# Patient Record
Sex: MALE | Race: BLACK OR AFRICAN AMERICAN | NOT HISPANIC OR LATINO | URBAN - METROPOLITAN AREA
[De-identification: names, ages, dates, MRNs, and addresses within clinical notes are randomized per-mention and may not be internally consistent; named-entity substitution may affect disease eponyms.]

---

## 2024-12-26 ENCOUNTER — APPOINTMENT (OUTPATIENT)
Dept: OPHTHALMOLOGY | Facility: CLINIC | Age: 29
End: 2024-12-26
Payer: MEDICAID

## 2025-01-06 ENCOUNTER — APPOINTMENT (OUTPATIENT)
Dept: OPHTHALMOLOGY | Facility: CLINIC | Age: 30
End: 2025-01-06
Payer: MEDICAID

## 2025-01-07 ENCOUNTER — APPOINTMENT (OUTPATIENT)
Dept: OPHTHALMOLOGY | Facility: CLINIC | Age: 30
End: 2025-01-07
Payer: MEDICAID

## 2025-01-14 ENCOUNTER — APPOINTMENT (OUTPATIENT)
Dept: OPHTHALMOLOGY | Facility: CLINIC | Age: 30
End: 2025-01-14
Payer: MEDICAID

## 2025-01-28 ENCOUNTER — APPOINTMENT (OUTPATIENT)
Dept: OPHTHALMOLOGY | Facility: CLINIC | Age: 30
End: 2025-01-28
Payer: MEDICAID

## 2025-01-28 DIAGNOSIS — H18.603 KERATOCONUS OF BOTH EYES: Primary | ICD-10-CM

## 2025-01-28 RX ORDER — ERGOCALCIFEROL 1.25 MG/1
CAPSULE ORAL WEEKLY
COMMUNITY
Start: 2019-11-27

## 2025-01-28 RX ORDER — DEXTROAMPHETAMINE SACCHARATE, AMPHETAMINE ASPARTATE MONOHYDRATE, DEXTROAMPHETAMINE SULFATE AND AMPHETAMINE SULFATE 5; 5; 5; 5 MG/1; MG/1; MG/1; MG/1
CAPSULE, EXTENDED RELEASE ORAL
COMMUNITY
Start: 2015-07-09

## 2025-01-28 RX ORDER — CLINDAMYCIN PHOSPHATE 10 MG/G
GEL TOPICAL
COMMUNITY
Start: 2017-07-28

## 2025-01-28 ASSESSMENT — REFRACTION_MANIFEST
OD_AXIS: 010
OS_SPHERE: -6.75
OS_CYLINDER: -7.00
OS_CYLINDER: -5.75
OD_CYLINDER: -6.25
OS_AXIS: 165
OD_SPHERE: -10.50
METHOD_AUTOREFRACTION: 1
OS_AXIS: 165
OD_SPHERE: -9.75
OD_AXIS: 010
OD_CYLINDER: -6.00
OS_SPHERE: -7.50

## 2025-01-28 ASSESSMENT — CONF VISUAL FIELD
OS_SUPERIOR_TEMPORAL_RESTRICTION: 0
OS_SUPERIOR_NASAL_RESTRICTION: 0
OD_INFERIOR_TEMPORAL_RESTRICTION: 0
OD_INFERIOR_NASAL_RESTRICTION: 0
OS_INFERIOR_NASAL_RESTRICTION: 0
OD_SUPERIOR_TEMPORAL_RESTRICTION: 0
OS_INFERIOR_TEMPORAL_RESTRICTION: 0
OD_NORMAL: 1
METHOD: COUNTING FINGERS
OD_SUPERIOR_NASAL_RESTRICTION: 0
OS_NORMAL: 1

## 2025-01-28 ASSESSMENT — REFRACTION_CURRENTRX
OS_CYLINDER: SPHERE
OS_SPHERE: -2.00
OD_SPHERE: -3.00
OS_DIAMETER: 17.0
OS_SPHERE: -3.00
OD_BASECURVE: 7.80
OS_DIAMETER: 17.0
OS_BASECURVE: 7.80
OS_BASECURVE: 7.80
OS_BRAND: ZENLENS PROLATE
OD_DIAMETER: 17.0
OD_BASECURVE: 7.80
OD_CYLINDER: SPHERE
OD_BRAND: ZENLENS PROLATE
OD_SPHERE: -2.00
OD_BRAND: ZENLENS PROLATE
OD_DIAMETER: 17.0

## 2025-01-28 ASSESSMENT — VISUAL ACUITY
METHOD: SNELLEN - LINEAR
OS_PH_SC: 20/80
OD_SC: 20/80
VA_OR_OD_CURRENT_RX: 20/20-1
OD_PH_SC: 20/70
OS_SC: 20/100
VA_OR_OS_CURRENT_RX: 20/20

## 2025-01-28 ASSESSMENT — ENCOUNTER SYMPTOMS
HEMATOLOGIC/LYMPHATIC NEGATIVE: 0
ENDOCRINE NEGATIVE: 0
RESPIRATORY NEGATIVE: 0
PSYCHIATRIC NEGATIVE: 0
MUSCULOSKELETAL NEGATIVE: 0
CARDIOVASCULAR NEGATIVE: 0
GASTROINTESTINAL NEGATIVE: 0
EYES NEGATIVE: 1
NEUROLOGICAL NEGATIVE: 0
ALLERGIC/IMMUNOLOGIC NEGATIVE: 0
CONSTITUTIONAL NEGATIVE: 0

## 2025-01-28 ASSESSMENT — EXTERNAL EXAM - RIGHT EYE: OD_EXAM: NORMAL

## 2025-01-28 ASSESSMENT — SLIT LAMP EXAM - LIDS
COMMENTS: NORMAL
COMMENTS: NORMAL

## 2025-01-28 ASSESSMENT — EXTERNAL EXAM - LEFT EYE: OS_EXAM: NORMAL

## 2025-01-28 NOTE — Clinical Note
Both eyes (OU) Contact Lens Order  Quantity: 1 Package: RGP Appointment needed? Yes Medically necessary? Yes Ship To: [facility] Additional instructions: RX2

## 2025-01-28 NOTE — PROGRESS NOTES
Assessment/Plan   Diagnoses and all orders for this visit:  Keratoconus of both eyes  -     Corneal Topography - OU - Both Eyes    Keratoconus: This is a disease of the cornea thought to be genetic that causes thinning, protrusion and irregularity of the cornea.  Because the cornea is the most important refracting surface of the eye, images are distorted and cannot be corrected well with traditional glasses.  Specialty contact lenses, most often rigid lenses, are required for best vision. A small percentage of patients require a corneal transplant.  Collagen crosslinking is considered for patients that show progression of the disease.     Successful fit in [facility] scleral CLs today. CLs ordered for in office dispense and I&R. Discussed that cross-linking can be considered in the future, after subsequent visits and evidence of progression. Monitor in 2-4 weeks for CL dispense and I&R or sooner with changes in vision.

## 2025-01-28 NOTE — RESEARCH NOTES
STUDY TEAM VISIT NOTE  Study Name: Genetic Risk of Recurrent Keratoconus Study  IRB#: [id number]  PI: Shruthi Murry OD, PhD  Coordinator for Today's Visit: Naty Peñaloza    Time point: Enrollment    Encounter Date: 01/28/2025  Encounter Time:  8:15 AM EST  Encounter Department: [facility]    INFORMED CONSENT   Review and sign Informed Consent Form with participant prior to beginning any study procedures: Yes  Document Informed Consent process on  Informed Consent Documentation Template and EHR : Yes   Subject age should be assessed at each visit to confirm current ICF is applicable: Yes      ALLERGY & HISTORY REVIEW   No Known Allergies  Past Medical History:   Diagnosis Date    Disorder of the skin and subcutaneous tissue, unspecified 12/22/2014    Skin lesion    Other chronic allergic conjunctivitis     Chronic allergic conjunctivitis    Personal history of urinary (tract) infections 01/01/2015    History of nongonococcal urethritis due to Chlamydia trachomatis     History reviewed. No pertinent surgical history.  No family history on file.     ASSESSMENT   Patient presented for enrollment into the Genetic Risk of Recurrent Keratoconus study.    Patient was consented and the Informed consent was obtained per Department SOP guidelines. The subject was found eligible to participate in the study.    All study data can be found on Electronic Data Capture system and on Case Report forms (as needed). Study procedures performed per protocol.     Visit completed uneventfully.      COORDINATOR CHECKLIST   Subject visit entered in [employer].  Subject compensation provided.  Were any assessments not completed during this visit?  If Yes, document protocol deviation.     Naty Peñaloza  01/28/25

## 2025-01-29 LAB
CENTRAL CORNEA THICKNESS (OD): 460 MICRONS
CENTRAL CORNEA THICKNESS (OS): 483 MICRONS
KMAX (OD): 62.6 DIOPTERS
KMAX (OS): 58.2 DIOPTERS
PACH THINNEST (OD): 445 MICRONS
PACH THINNEST (OS): 456 MICRONS
POSTERIOR FLOAT (OD): 489 MICRONS
POSTERIOR FLOAT (OS): 457 MICRONS
SIMK FLAT (OD): 49.5 DIOPTERS
SIMK FLAT (OS): 46.5 DIOPTERS
SIMK STEEP (OD): 55.7 DIOPTERS
SIMK STEEP (OS): 53.8 DIOPTERS
SIMK STEEP AXIS (OD): 74.4 DEGREES
SIMK STEEP AXIS (OS): 63.2 DEGREES

## 2025-02-18 ENCOUNTER — APPOINTMENT (OUTPATIENT)
Dept: OPHTHALMOLOGY | Facility: CLINIC | Age: 30
End: 2025-02-18
Payer: MEDICAID

## 2025-02-18 DIAGNOSIS — H18.603 KERATOCONUS OF BOTH EYES: Primary | ICD-10-CM

## 2025-02-18 PROCEDURE — 99212 OFFICE O/P EST SF 10 MIN: CPT | Performed by: OPTOMETRIST

## 2025-02-18 RX ORDER — SODIUM CHLORIDE FOR INHALATION 0.9 %
VIAL, NEBULIZER (ML) INHALATION
Qty: 500 ML | Refills: 3 | Status: SHIPPED | OUTPATIENT
Start: 2025-02-18

## 2025-02-18 ASSESSMENT — CONF VISUAL FIELD
OS_INFERIOR_NASAL_RESTRICTION: 0
OD_INFERIOR_NASAL_RESTRICTION: 0
OD_SUPERIOR_NASAL_RESTRICTION: 0
OD_SUPERIOR_TEMPORAL_RESTRICTION: 0
OD_INFERIOR_TEMPORAL_RESTRICTION: 0
OS_SUPERIOR_NASAL_RESTRICTION: 0
OS_NORMAL: 1
OD_NORMAL: 1
OS_SUPERIOR_TEMPORAL_RESTRICTION: 0
OS_INFERIOR_TEMPORAL_RESTRICTION: 0

## 2025-02-18 ASSESSMENT — VISUAL ACUITY
METHOD: SNELLEN - LINEAR
VA_OR_OD_CURRENT_RX: 20/25+2
OS_SC: 20/80
VA_OR_OS_CURRENT_RX: 20/20-2
OD_SC: 20/100

## 2025-02-18 ASSESSMENT — ENCOUNTER SYMPTOMS
CONSTITUTIONAL NEGATIVE: 0
PSYCHIATRIC NEGATIVE: 0
EYES NEGATIVE: 1
ALLERGIC/IMMUNOLOGIC NEGATIVE: 0
NEUROLOGICAL NEGATIVE: 0
GASTROINTESTINAL NEGATIVE: 0
RESPIRATORY NEGATIVE: 0
ENDOCRINE NEGATIVE: 0
MUSCULOSKELETAL NEGATIVE: 0
CARDIOVASCULAR NEGATIVE: 0
HEMATOLOGIC/LYMPHATIC NEGATIVE: 0

## 2025-02-18 ASSESSMENT — REFRACTION_CURRENTRX
OD_BRAND: ZENLENS PROLATE
OS_DIAMETER: 17.0
OD_DIAMETER: 17.0
OS_SPHERE: -2.00
OS_BASECURVE: 7.80
OD_BASECURVE: 7.80
OD_SPHERE: -2.00

## 2025-02-18 ASSESSMENT — EXTERNAL EXAM - LEFT EYE: OS_EXAM: NORMAL

## 2025-02-18 ASSESSMENT — SLIT LAMP EXAM - LIDS
COMMENTS: NORMAL
COMMENTS: NORMAL

## 2025-02-18 ASSESSMENT — EXTERNAL EXAM - RIGHT EYE: OD_EXAM: NORMAL

## 2025-02-18 NOTE — PATIENT INSTRUCTIONS
Dr. Shruthi Sharma        Appointments:   [phone]  Contact Lens Orders:  [phone]       GAS PERMEABLE CONTACT LENSES  CARE AND GENERAL GUIDELINES  General Contact Lens Guidelines    Any time a contact lens is removed from the eye, it must be cleaned and disinfected before being reinserted    Always wash hands before handling contact lenses.  Avoid soaps containing additives such as lotions, creams, oils or perfumes. Anti-bacterial soaps are recommended    Whenever lenses have been removed from the case, discard the solution, rinse the case vigorously with saline or disinfecting solution, wipe with clean, dry tissue and allow it to air dry upside down.      Replace lens case monthly.  It is critical to keep your lens case CLEAN!     Routine replacement of the contact lens case can help to reduce the risk of contact lens contamination    Use only commercially prepared saline and cleaning solutions, never use homemade or generic saline    Never reuse solutions after one cleaning/disinfection cycle    Never use saliva or water to moisten a contact lens, never put a contact lens in your mouth.  Doing any of these may lead to an eye infection    Patients should always check with us before changing solutions    Contact lenses should never be stored in non-sterile fluids such as distilled water, tap water, bottled water or home purified water    Lenses or cases should not be rinsed in tap water.  Traditional contact lens approved saline solutions may be used to rinse  off lenses or if needed prior to insertion.    Lens lubricating/rewetting drops can be placed directly into the eye while contact lenses are being worn to increase lens wearing comfort    Do not sleep in you lenses unless you have been fit with lenses specifically designed for extended-wear and your doctor has approved them for  that purpose    Avoid swimming pools and hot tubs while wearing your lenses    All contact lens wearers, with few exceptions, need a pair of spectacles for emergencies and for rest from contact lenses    Contact lens wearers should have an eye exam annually or sooner, if indicated by your doctor    Cosmetics:     Apply makeup after inserting contacts and remove contact lenses before removing makeup.  This will help prevent transfer of these substances from your hands to the lens surface.    Use a good quality, water soluble mascara rather than waterproof or water-resistant types.  Replace old mascara every three months as it may become contaminated and cause infection.    Avoid aerosol sprays when your contacts are in, or remember to shield your eyes.  Walk away from the area where the spray was used since a mist of spray often lingers in the air.  If possible, use hairspray before inserting your contact lenses.    Adaptation:    Complete adaptation to contact lenses normally takes from 2-6 weeks, and some patients may take up to 12 weeks.  Normal adaptation symptoms include:    A sensation that feels much like a small eyelash is in the eye.  This feeling gradually disappears    Vision may be a little watery and may change as you blink    You may notice mild redness, tearing or itching of the eyes    Awareness of lens movement or increased blinking may be noticed.  It is important that you continue to blink fully and completely    You may be sensitive to bright light.  This sensitivity will lessen, but a good non-prescription pair of sunglasses will often provide the greatest comfort outdoors    You may experience halos or edge awareness while adapting    Some patients have temporarily improved uncorrected vision when they remove their lenses    You may notice variable vision when you remove your lenses.  This may include increased blurred vision with no glasses or with your habitual glasses, which lasts from 30  minutes to up to 4 hours after rigid lenses are removed.  This effect is greatest with patients that have had corneal surgery.    Signs of Caution: If you are ever unsure about whether what you are experiencing is part of normal adaptation, please call your doctor.    Persistent or severe redness  Continued or excessive tearing  Extreme light sensitivity  Inability to open eyes   Pain      REMEMBER: If in DOUBT, take your lenses OUT!            Lens Wear:    We have recommended a schedule for wearing the lenses during the adaptation period.  This consists of slowly increasing the wearing time so your eyes adapt properly to the lenses.  How rapidly the wearing time increases depends upon the type of lens being worn and the specific characteristics of your eyes.  Follow the wearing schedule below unless your doctor tells you otherwise:    Day One: 4 hours  Day Two: 5 hours  Day Three: 6 hours  Day Four: 7 hours  Day Five: 8 hours  Day Six: 9 hours  Day Seven: 10 hours    After Day Seven you will remain at 10 hours of wearing time maximum until your scheduled follow-up appointment.    REWETTING DROPS FOR COMFORT:    *Acceptable rigid gas permeable rewetting drops:    [place] Rewetting Drops   Refresh Relieva for Contacts Rewetting Drops   Blink for Contacts Rewetting Drops                *We do not recommend artificial tears to be used while the lenses are worn.    NOTE for Saline Rinses:     You may purchase a commercially available saline such as Bausch & Lomb Sensitive Eyes Saline in a drug store, or a single use non-preserved saline solution such as Lacripure (by Menicon) or ScleralFil (by Bausch & Lomb) available at our office or on Amazon    DO NOT STORE LENSES OVERNIGHT IN SALINE SOLUTION, IT IS USED FOR RINSES ONLY      FOR TRADITIONAL RIGID GAS PERMEABLE CONTACT LENSES  (These are the smaller rigid lens types that sit directly on your cornea)    INSERTION  o Wash hands with lotion free soap and DRY HANDS.   o  Place lens in the palm of your hand.   o Rinse lens (if needed) and drain excess solution by cupping your hand.   o Place lens on DRY index finger of dominant hand  o Use your middle finger of the non-dominant hand to hold up your upper lashes and your      other middle finger to pull your lower lid down.   o Place lens directly on the colored part of your eye. Pull your index finger away and slowly release your lids     [place] and UNIQUE pH USERS: If the lens has been soaking overnight, you may insert the lens directly onto your eye in the morning with the [place] or Unique pH conditioning solution still on the lens, or you may rinse the lens in fresh rewetting/conditioning solution and then place it directly onto the eye.    CLEAR CARE USERS: If the lens has been fully neutralized, we suggest another rinse with fresh saline or a conditioning solution ([place] or Unique pH) prior to insertion     REMOVAL  BLINK METHOD  Tilt your head down while looking into a mirror flat on a table.    Cup your hand under the eye to catch the lens as it is removed from the eye.  Open your eyes wide and pull tightly (pull out and up) at the outer corner of your eye.  Blink hard.  The lens should pop out    TWO HAND METHOD  Tilt your head down while looking into a mirror flat on a table.  Place the index finger of one hand on the upper eyelid directly next to the lash line and above the contact lens.  Place the index finger of the other hand similarly on the lower lid, but below the contact lens.  (You must be very close to your lash line!  Do not invert your lids to show the inner pink portion underneath!)  Gently press the lids against the white part of the eye.  Maintain pressure against the eye and gently bring the upper and lower lids together to squeeze the lens out.  The lens should pop out.    CLEANING ….If cleaning lenses over a sink make sure that the drain is plugged!!!    IF USING [place] ORIGINAL OR [place]ON ADVANCE  Remove  lens and place in palm of hand  Place 2 drops of daily  on lens (maroon cap, red tip)   Rub lens gently for 15 seconds  Rinse lens with saline  Place lens in contact lens case with fresh conditioning solution (blue cap, white tip) overnight (at least 6 hours)    IF USING [place] SIMPLUS or UNIQUE pH**  Remove lens and place in palm of hand  Place 2 drops of [place] Simplus or Unique pH solution on lens  Rub lens gently for 15 seconds, rinse with saline  Place lens in contact lens case with fresh [place] Simplus or Unique pH solution overnight (at least 6 hours)      IF USING CLEAR CARE    Rub lenses with Clear Care solution (watch touching eyes after this because this is hydrogen peroxide and if it touches your eye directly it will sting!0  Rinse lenses with saline solution  Place lenses in appropriate side of basket marked right or left.  Fill clear vial to the line with Clear Care disinfecting solution.  Place contacts in disinfecting solution (foaming will occur, don’t be alarmed if vial overflows) for at least 6 hours  The disk neutralizes the disinfecting solution after 6 hours  Rinse the contacts with a commercially prepared saline prior to insertion and a RGP approved rewetting or conditioning solution may be used to insert  The container and disk must be discarded whenever you purchase new /rinse and disinfectant.    Not changing/discarding the disk will result in traces of disinfectant on the lenses even after a saline rinse.  You would then experience mild burning and redness      NOTE for Saline Rinses:     You may purchase a commercially available saline such as Bausch & Lomb Sensitive Eyes Saline in a drug store, or a single use non-preserved saline solution such as Lacripure (by [employer]) or ScleralFil (by Bausch & Lomb) available at our office or on Amazon    DO NOT STORE LENSES OVERNIGHT IN SALINE SOLUTION, IT IS USED FOR RINSES ONLY    IF YOUR DOCTOR HAS SUGGESTED USING PROGENT  TREATMENTS:    If using Progent, it is very important to follow the instruction in the package;    Place lenses in the proper side (R/L) on the cap of the case  Mix solution A and B at the same time into the case  Put cap on case and shake vigorously for 1 minute  Leave on countertop for ONLY 30 minutes  When 30 minutes is complete uncap the case and throw the solution directly down the drain followed by water  Rinse lenses thoroughly with Saline Solution  DISINFECT and STORE lenses overnight in your prescribed lens care solution as listed above prior to insertion  You can buy PROGENT through our office or http://AppTrigger.iHandle/ with the following code for the  [facility] [place] [id number]    IF YOUR LENS IS COATED WITH HYDRAPEG THE ONLY SOLUTIONS YOU CAN USE ARE…   o [place] Simplus   o Unique Ph   o Clear Care    * do not use additional abrasive (like [place] [red top] ) or alcohol based  with either option   * Progent will remove the HydraPeg Coating       IF YOU WEAR….PIGGYBACK CONTACT LENSES    *Insert soft contact lens first!    INSERTING SOFT CONTACT LENSES    Wash hands with lotion free soap and DRY HANDS.  Place lens in the palm of your hand.  Rinse lens and drain excess solution by cupping your hand.  Place lens on DRY index finger  Lens should look like a bowl with no sides touching your finger  Use your middle finger to hold up your upper lashes and your other middle finger to pull your lower lid down.  Place lens directly on the colored part of your eye.  Pull your index finger away and while continuing to hold your lids look up, down, left and right.   Once both soft lenses are in place rigid gas permeable lens right on top of it  Follow steps for insertion of rigid gas permeable lenses     REMOVING SOFT CONTACT LENSES  *Remove the rigid lens using the above instructions of rigid gas permeable lenses   *Once your rigid contact lens is removed you may remove your soft contact  lens.  Hands should already be washed with lotion free soap and dried  Use your middle finger to hold up your upper lashes and your other middle finger to pull your lower lid down.  Using your thumb and index finger pinch the edge of the lens and pull it off your eye.  Follow the recommended cleaning and storage procedures (if not a daily disposable soft lens) and repeat for the other eye.    CLEANING… OF SOFT & RIGID PIGGYBACK CONTACT LENSES  *Clean and store the rigid lens as listed in this packet. Many soft lenses worn as piggybacks are now daily disposables and do not require cleaning, if soft lenses are not daily disposables follow the instructions below.     VERY IMPORTANT: When a rigid lens is placed atop the soft lens, it MUST be rinsed with saline or multipurpose soft lens solutions prior to inserting the rigid lens.  That is, no not insert the RGP lens on top of the soft with rigid lens solutions such as [place], Unique Ph or Optimum.     SOFT CONTACT LENSES:  Follow these steps for cleaning and storing the soft lens (unless they daily disposable, those are discarded nightly and not re-cleaned).  Once lens is removed place it in palm of hand  Place 5 drops of soft lens multipurpose solution on lens and gently rub for 15 seconds  Rinse lenses using soft lens multipurpose solution  Place lens in case filled with fresh soft multipurpose lens solution  Soak lenses 6 hours to disinfect   Remove lenses from case and rinse with fresh soft multipurpose solution prior to insertion  Discard used solution from case and wipe case dry until next use       Recommended Soft Contact Lens Multipurpose Solutions:     Optifree Express   Optifree Replenish   Optifree PureMoist   BioTrue   Anjelica   Revitalens      Note: Your doctor may recommend another soft lens disinfecting solution; If Clear Care is recommended follow the instructions for Clear Care above.      IF YOU WEAR….SYNERGEYES (HYBRID) CONTACT LENSES   See this website  for video instructions on SynergEyes Care and Handling: [email or web address]    INSERTING SYNERGEYES LENSES:     TWO FINGER METHOD  Place lens concave side up in between your index and middle finger  Fill lens to the top with unit dose non-preserved saline (Lacripure or ScleralFil preferred)  Place head parallel with floor and hold lids open   Gently push lens up to eye  Hold for 5 seconds then remove fingers from lens    TRIPOD METHOD  Place lens concave side up balancing between your thumb, middle and index fingers to form a tripod to balance on  Fill lens to the top with unit dose non-preserved saline (Lacripure or ScleralFil preferred)  Place head parallel with floor and hold lids open   Gently push lens up to eye  Hold for 5 seconds then fingers from lens     PLUNGER METHOD  Place lens concave side up on large plunger-childress  Fill lens to the top with unit dose non-preserved saline (Lacripure or ScleralFil preferred)  Place head parallel with floor and hold lids open   Gently push lens and plunger up to eye  Hold for 5 seconds then remove plunger-childress    REMOVING SYNERGEYES LENSES:   Place your left middle finger to hold up your upper lashes and your right middle finger to pull your lower lid down  Looking straight ahead or slightly up, use the pads of your thumb and index finger to pinch the lower edge of soft skirt and pull it off your eye  Using a narrow pinch and VERY dry hands will make removal easier  Do not use a plunger to remove SynergEyes lenses    CLEANING… If cleaning lenses over a sink make sure that the drain is plugged!!!  *BioTrue, Optifree Express, or ClearCare are recommended for use with Synergeyes lenses.    IF USING OPTIFREE EXPRESS OR BIOTRUE  Place lens concave side up on palm of hand  Gently rub lens for 5 seconds with multipurpose solution  Rinse thoroughly with multipurpose solution  Place in contact lens case with fresh multipurpose solution for 6  hours    IF USING CLEAR CARE  Rub lenses with Clear Care solution (watch touching eyes after this because this is hydrogen peroxide and if it touches your eye directly it will sting!)  Rinse lenses with saline solution  Place lenses in appropriate side of basket marked right or left.  Fill clear vial to the line with Clear Care disinfecting solution.  Place contacts in disinfecting solution (foaming will occur, don’t be alarmed if vial overflows) for at least 6 hours  The disk neutralizes the disinfecting solution after 6 hours  Rinse the contacts with a commercially prepared saline prior to insertion and a [facility] approved rewetting or conditioning solution may be used to insert  The container and disk must be discarded whenever you purchase new /rinse and disinfectant.    Not changing/discarding the disk will result in traces of disinfectant on the lenses even after a saline rinse.  You would then experience mild burning and redness    IF YOU WEAR….SCLERAL CONTACT LENSES     *VERY IMPORTANT*: ONLY NON-PRESERVED UNIT DOSE SALINE IS USED TO INSERT SCLERAL LENSES, NO OTHER PRESERVED SALINE OR MULTIPUPROSE SOLUTIONS ARE ALLOWED!    INSERTING SCLERAL LENSES     STYLES METHOD  Wash hands  Place lens concave side up on a clean large styles  Fill lens to the top with unit dose non-preserved saline (Lacripure or ScleralFil preferred)*  Place head parallel with floor and hold lids open with one hand  Gently push lens up on to eye  Will feel cold as saline touches eye  Remove styles  from eye and lift head slowly  If bubble is in chamber repeat insertion with more saline filling lens     TRIPOD METHOD  Place lens concave side up resting on thumb, pointer finger and middle finger or on pointer and middle finger  Fill lens to top with unit dose non-preserved saline (Lacripure or ScleralFil preferred)*  Place head parallel with floor and hold lids open  Gently push lens up on to eye  Will feel cold as saline touches eye  If  bubble is in chamber repeat insertion with more unit dose non-preserved saline filling lens    REMOVING SCLERAL LENSES     SUCTION CUP METHOD  Use lower lid to loosen inferior lens edge at 6 o’clock, try to create an air bubble behind the lens    Place clean suction cup at 6 o’clock position of lens  Gently tilt lens off of eye     TWO HAND METHOD  Hold eye wide open   Look down and push upper lid under lens to break seal  Can also be accomplished by pushing in on the white part of the eye below the lens to break the seal  Keep upper lid firmly against top of lens  Use lower lid to push beneath and under the lower edge of the lens, the lens should pop out of the eye    CLEANING… SCLERAL LENSES   Use only [place], Unique pH**, or Clear Care cleaning solutions; Follow steps above for specific cleaning system    For more information or help on insertion and removal and troubleshooting of scleral lenses refer to www.sclerallens.org  * Your doctor may prescribe unit dose saline through your pharmacy   *Lacripure may be purchased at [email or web address], or from our office, or [employer]  * ScleralFil is made by Bausch & Lomb and may be purchased at [employer] or from our office   **Unique ph may be purchased at [email or web address]  or from our office or Amazon.      CONTACT LENS FEES/CREDIT POLICIES:    All contact lens material fees are due the day that lenses are ordered, with the exception of eligible Medicare patients or medically indicated lenses. Fitting fees are due by the final fitting visit.        Standard rigid lenses are warranted for 90 days from the original order date against breakage, parameter changes or cancellation at 100%.  Lost lenses cannot be credited.  Specialty rigid lenses will be credited at 75% against cancellation within the 90 day warranty period.      Fitting fees are professional service fees which cannot be credited once a fitting is initiated.          The cost of my lenses  per eye are $_________________.    The cost of my entire fitting fee is $_________________.    Patient signature: _____________________________ Date: ________________    Tech/Doctor’s Signature:  ________________________ Date: ________________

## 2025-02-18 NOTE — PROGRESS NOTES
Assessment/Plan   Diagnoses and all orders for this visit:  Keratoconus of both eyes    Specialty Contact Lenses:  Specialty contact lenses were dispensed today for treatment of a medically indicated condition. The patient was educated on the proper care, handling, insertion and removal of the lenses.  The patient should follow the wearing time and return for follow-up as indicated, and call or come in to the office if the eye becomes red or painful after wearing the lenses.     Assessment/Plan   Diagnoses and all orders for this visit:  Keratoconus of both eyes  -     sodium chloride 0.9 % nebulizer solution; (100) 5 ml non-preserved vials, Use off-label to insert into bowl of scleral contact lens prior to insertion    Follow up 2--3 weeks

## 2025-03-03 ENCOUNTER — APPOINTMENT (OUTPATIENT)
Dept: OPHTHALMOLOGY | Facility: CLINIC | Age: 30
End: 2025-03-03
Payer: MEDICAID

## 2025-03-11 ENCOUNTER — APPOINTMENT (OUTPATIENT)
Dept: OPHTHALMOLOGY | Facility: CLINIC | Age: 30
End: 2025-03-11
Payer: MEDICAID

## 2025-03-13 ENCOUNTER — APPOINTMENT (OUTPATIENT)
Dept: OPHTHALMOLOGY | Facility: CLINIC | Age: 30
End: 2025-03-13
Payer: MEDICAID

## 2025-03-27 ENCOUNTER — APPOINTMENT (OUTPATIENT)
Facility: HOSPITAL | Age: 30
End: 2025-03-27
Payer: MEDICAID

## 2025-04-07 ENCOUNTER — APPOINTMENT (OUTPATIENT)
Dept: OPHTHALMOLOGY | Facility: CLINIC | Age: 30
End: 2025-04-07
Payer: MEDICAID

## 2025-06-03 ENCOUNTER — APPOINTMENT (OUTPATIENT)
Dept: OPHTHALMOLOGY | Facility: CLINIC | Age: 30
End: 2025-06-03
Payer: MEDICAID

## 2025-07-30 ENCOUNTER — APPOINTMENT (OUTPATIENT)
Dept: DERMATOLOGY | Facility: CLINIC | Age: 30
End: 2025-07-30
Payer: MEDICAID

## 2025-08-01 ENCOUNTER — TELEPHONE (OUTPATIENT)
Dept: OPHTHALMOLOGY | Facility: CLINIC | Age: 30
End: 2025-08-01
Payer: MEDICAID

## 2025-08-08 DIAGNOSIS — H18.603 KERATOCONUS OF BOTH EYES: ICD-10-CM

## 2025-08-08 RX ORDER — SODIUM CHLORIDE FOR INHALATION 0.9 %
VIAL, NEBULIZER (ML) INHALATION
Qty: 500 ML | Refills: 3 | Status: SHIPPED | OUTPATIENT
Start: 2025-08-08

## 2025-09-16 ENCOUNTER — APPOINTMENT (OUTPATIENT)
Dept: OPHTHALMOLOGY | Facility: CLINIC | Age: 30
End: 2025-09-16
Payer: MEDICAID

## 2025-12-18 ENCOUNTER — APPOINTMENT (OUTPATIENT)
Dept: DERMATOLOGY | Facility: CLINIC | Age: 30
End: 2025-12-18
Payer: MEDICAID